# Patient Record
Sex: MALE | Race: OTHER | HISPANIC OR LATINO | Employment: FULL TIME | URBAN - METROPOLITAN AREA
[De-identification: names, ages, dates, MRNs, and addresses within clinical notes are randomized per-mention and may not be internally consistent; named-entity substitution may affect disease eponyms.]

---

## 2020-06-29 ENCOUNTER — TELEPHONE (OUTPATIENT)
Dept: FAMILY MEDICINE | Facility: CLINIC | Age: 55
End: 2020-06-29

## 2020-06-29 NOTE — TELEPHONE ENCOUNTER
Call patient or his wife who is a patient of mine.  Patient can be scheduled in an EP appointment to establish care and put in the appointment notes new patient, provide patient a history form    Remind them to try and bring records

## 2021-08-02 ENCOUNTER — LAB VISIT (OUTPATIENT)
Dept: PRIMARY CARE CLINIC | Facility: CLINIC | Age: 56
End: 2021-08-02
Payer: COMMERCIAL

## 2021-08-02 DIAGNOSIS — Z20.822 ENCOUNTER FOR LABORATORY TESTING FOR COVID-19 VIRUS: ICD-10-CM

## 2021-08-02 PROCEDURE — U0005 INFEC AGEN DETEC AMPLI PROBE: HCPCS | Performed by: INTERNAL MEDICINE

## 2021-08-02 PROCEDURE — U0003 INFECTIOUS AGENT DETECTION BY NUCLEIC ACID (DNA OR RNA); SEVERE ACUTE RESPIRATORY SYNDROME CORONAVIRUS 2 (SARS-COV-2) (CORONAVIRUS DISEASE [COVID-19]), AMPLIFIED PROBE TECHNIQUE, MAKING USE OF HIGH THROUGHPUT TECHNOLOGIES AS DESCRIBED BY CMS-2020-01-R: HCPCS | Performed by: INTERNAL MEDICINE

## 2021-08-03 DIAGNOSIS — U07.1 COVID-19 VIRUS DETECTED: ICD-10-CM

## 2021-08-03 LAB
SARS-COV-2 RNA RESP QL NAA+PROBE: DETECTED
SARS-COV-2- CYCLE NUMBER: 15.05

## 2021-12-29 ENCOUNTER — OFFICE VISIT (OUTPATIENT)
Dept: URGENT CARE | Facility: CLINIC | Age: 56
End: 2021-12-29
Payer: COMMERCIAL

## 2021-12-29 VITALS
DIASTOLIC BLOOD PRESSURE: 87 MMHG | OXYGEN SATURATION: 96 % | RESPIRATION RATE: 20 BRPM | WEIGHT: 220 LBS | HEART RATE: 96 BPM | BODY MASS INDEX: 32.58 KG/M2 | HEIGHT: 69 IN | SYSTOLIC BLOOD PRESSURE: 135 MMHG | TEMPERATURE: 99 F
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
## 2021-12-29 DIAGNOSIS — M25.532 LEFT WRIST PAIN: ICD-10-CM

## 2021-12-29 DIAGNOSIS — Z02.6 ENCOUNTER RELATED TO WORKER'S COMPENSATION CLAIM: Primary | ICD-10-CM

## 2021-12-29 DIAGNOSIS — S63.502A SPRAIN OF LEFT WRIST, INITIAL ENCOUNTER: ICD-10-CM

## 2021-12-29 PROCEDURE — 73130 XR HAND COMPLETE 3 VIEW LEFT: ICD-10-PCS | Mod: FY,LT,S$GLB, | Performed by: RADIOLOGY

## 2021-12-29 PROCEDURE — 99203 OFFICE O/P NEW LOW 30 MIN: CPT | Mod: S$GLB,,, | Performed by: PHYSICIAN ASSISTANT

## 2021-12-29 PROCEDURE — 73110 XR WRIST COMPLETE 3 VIEWS LEFT: ICD-10-PCS | Mod: FY,LT,S$GLB, | Performed by: RADIOLOGY

## 2021-12-29 PROCEDURE — 99203 PR OFFICE/OUTPT VISIT, NEW, LEVL III, 30-44 MIN: ICD-10-PCS | Mod: S$GLB,,, | Performed by: PHYSICIAN ASSISTANT

## 2021-12-29 PROCEDURE — 73110 X-RAY EXAM OF WRIST: CPT | Mod: FY,LT,S$GLB, | Performed by: RADIOLOGY

## 2021-12-29 PROCEDURE — 73130 X-RAY EXAM OF HAND: CPT | Mod: FY,LT,S$GLB, | Performed by: RADIOLOGY

## 2021-12-29 RX ORDER — ATORVASTATIN CALCIUM 40 MG/1
40 TABLET, FILM COATED ORAL DAILY
COMMUNITY
Start: 2021-11-18

## 2021-12-29 RX ORDER — METFORMIN HYDROCHLORIDE 1000 MG/1
1000 TABLET ORAL 2 TIMES DAILY
COMMUNITY
Start: 2021-12-03

## 2021-12-29 RX ORDER — AMLODIPINE AND BENAZEPRIL HYDROCHLORIDE 10; 20 MG/1; MG/1
1 CAPSULE ORAL DAILY
COMMUNITY
Start: 2021-11-18

## 2021-12-29 RX ORDER — ASCORBIC ACID 500 MG
1 TABLET ORAL DAILY
COMMUNITY

## 2021-12-30 ENCOUNTER — OFFICE VISIT (OUTPATIENT)
Dept: URGENT CARE | Facility: CLINIC | Age: 56
End: 2021-12-30
Payer: COMMERCIAL

## 2021-12-30 VITALS
OXYGEN SATURATION: 97 % | TEMPERATURE: 98 F | SYSTOLIC BLOOD PRESSURE: 128 MMHG | DIASTOLIC BLOOD PRESSURE: 82 MMHG | RESPIRATION RATE: 20 BRPM | HEART RATE: 91 BPM

## 2021-12-30 DIAGNOSIS — Z02.6 ENCOUNTER RELATED TO WORKER'S COMPENSATION CLAIM: Primary | ICD-10-CM

## 2021-12-30 DIAGNOSIS — S63.502A SPRAIN OF LEFT WRIST, INITIAL ENCOUNTER: ICD-10-CM

## 2021-12-30 PROCEDURE — 99213 PR OFFICE/OUTPT VISIT, EST, LEVL III, 20-29 MIN: ICD-10-PCS | Mod: S$GLB,,, | Performed by: NURSE PRACTITIONER

## 2021-12-30 PROCEDURE — 99213 OFFICE O/P EST LOW 20 MIN: CPT | Mod: S$GLB,,, | Performed by: NURSE PRACTITIONER

## 2022-01-03 ENCOUNTER — OFFICE VISIT (OUTPATIENT)
Dept: URGENT CARE | Facility: CLINIC | Age: 57
End: 2022-01-03
Payer: COMMERCIAL

## 2022-01-03 DIAGNOSIS — S63.502D SPRAIN OF LEFT WRIST, SUBSEQUENT ENCOUNTER: ICD-10-CM

## 2022-01-03 DIAGNOSIS — Z02.6 ENCOUNTER RELATED TO WORKER'S COMPENSATION CLAIM: Primary | ICD-10-CM

## 2022-01-03 PROCEDURE — 99212 OFFICE O/P EST SF 10 MIN: CPT | Mod: S$GLB,,, | Performed by: NURSE PRACTITIONER

## 2022-01-03 PROCEDURE — 99212 PR OFFICE/OUTPT VISIT, EST, LEVL II, 10-19 MIN: ICD-10-PCS | Mod: S$GLB,,, | Performed by: NURSE PRACTITIONER

## 2022-01-03 NOTE — LETTER
Occupational Health - Urgent Care  [address], [address]  [place] [address]  Phone: [phone]  Fax: [phone]  [facility]: [id number]    Pt Name: Julio Salinas  Injury Date: 12/29/2021   Employee ID: [id number] Date of Treatment: 01/03/2022   Company: [employer]      Appointment Time: 03:30 PM Arrived: 2:53 PM   Provider: NITHYA Stone Time Out: 4:05 PM     Office Treatment:   1. Encounter related to worker's compensation claim    2. Sprain of left wrist, subsequent encounter          Patient Instructions: Attention not to aggravate affected area      Restrictions: Regular Duty     Return As needed. SOLOMON

## 2022-01-03 NOTE — PROGRESS NOTES
Subjective:       Patient ID: Julio Salinas is a 56 y.o. male.    Chief Complaint: Wrist Pain    RV,Workers Comp injury DOI 12/29/2021. Patient states his LEFT WRIST is feeling much better.  He followed instruction on icing and elevation, and feels great. jh    Wrist Pain   The pain is present in the left hand. The problem has been resolved. The pain is at a severity of 0/10. The patient is experiencing no pain. Pertinent negatives include no limited range of motion.       Constitution: Negative.   HENT: Negative.    Cardiovascular: Negative.    Eyes: Negative.    Respiratory: Negative.    Gastrointestinal: Negative.  Negative for bowel incontinence.   Endocrine: negative.   Genitourinary: Negative.  Negative for dysuria, flank pain, bladder incontinence and pelvic pain.   Musculoskeletal: Negative.  Negative for pain, abnormal ROM of joint and back pain.   Skin: Negative.    Allergic/Immunologic: Negative.    Neurological: Negative.    Hematologic/Lymphatic: Negative.    Psychiatric/Behavioral: Negative.         Objective:      Physical Exam  Vitals and nursing note reviewed.   Constitutional:       Appearance: He is well-developed and well-nourished.   HENT:      Head: Normocephalic and atraumatic.      Right Ear: External ear normal.      Left Ear: External ear normal.      Nose: Nose normal.      Mouth/Throat:      Mouth: Oropharynx is clear and moist.   Eyes:      Extraocular Movements: EOM normal.      Conjunctiva/sclera: Conjunctivae normal.      Pupils: Pupils are equal, round, and reactive to light.   Cardiovascular:      Rate and Rhythm: Normal rate and regular rhythm.      Heart sounds: Normal heart sounds.   Abdominal:      General: Bowel sounds are normal.      Palpations: Abdomen is soft.   Musculoskeletal:         General: Normal range of motion.      Cervical back: Normal range of motion and neck supple.   Skin:     General: Skin is warm and dry.      Capillary Refill: Capillary refill takes less  than 2 seconds.   Neurological:      Mental Status: He is alert and oriented to person, place, and time.   Psychiatric:         Mood and Affect: Mood and affect normal.         Behavior: Behavior normal.         Thought Content: Thought content normal.         Judgment: Judgment normal.         Assessment:       1. Encounter related to worker's compensation claim    2. Sprain of left wrist, subsequent encounter        Plan:       Patient improved. Discharged from care     Patient Instructions: Attention not to aggravate affected area   Restrictions: Regular Duty  Follow up if symptoms worsen or fail to improve.

## 2022-01-19 ENCOUNTER — CLINICAL SUPPORT (OUTPATIENT)
Dept: OTHER | Facility: CLINIC | Age: 57
End: 2022-01-19
Payer: COMMERCIAL

## 2022-01-19 DIAGNOSIS — Z00.8 ENCOUNTER FOR OTHER GENERAL EXAMINATION: ICD-10-CM

## 2022-01-20 VITALS — HEIGHT: 69 IN | BODY MASS INDEX: 32.49 KG/M2

## 2022-01-20 LAB
HDLC SERPL-MCNC: 36 MG/DL
POC CHOLESTEROL, LDL (DOCK): 75 MG/DL
POC CHOLESTEROL, TOTAL: 128 MG/DL
POC GLUCOSE, FASTING: 125 MG/DL (ref 60–110)
TRIGL SERPL-MCNC: 85 MG/DL

## 2022-08-09 ENCOUNTER — OCCUPATIONAL HEALTH (OUTPATIENT)
Dept: URGENT CARE | Facility: CLINIC | Age: 57
End: 2022-08-09

## 2022-08-09 ENCOUNTER — OFFICE VISIT (OUTPATIENT)
Dept: URGENT CARE | Facility: CLINIC | Age: 57
End: 2022-08-09
Payer: COMMERCIAL

## 2022-08-09 VITALS
RESPIRATION RATE: 18 BRPM | WEIGHT: 220 LBS | OXYGEN SATURATION: 98 % | TEMPERATURE: 99 F | DIASTOLIC BLOOD PRESSURE: 79 MMHG | BODY MASS INDEX: 32.58 KG/M2 | HEART RATE: 62 BPM | SYSTOLIC BLOOD PRESSURE: 133 MMHG | HEIGHT: 69 IN

## 2022-08-09 DIAGNOSIS — R55 NEAR SYNCOPE: ICD-10-CM

## 2022-08-09 DIAGNOSIS — Z02.6 ENCOUNTER RELATED TO WORKER'S COMPENSATION CLAIM: ICD-10-CM

## 2022-08-09 DIAGNOSIS — Z13.9 ENCOUNTER FOR SCREENING: Primary | ICD-10-CM
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
## 2022-08-09 DIAGNOSIS — S32.040A COMPRESSION FRACTURE OF L4 VERTEBRA, INITIAL ENCOUNTER: Primary | ICD-10-CM

## 2022-08-09 DIAGNOSIS — M54.9 DORSALGIA, UNSPECIFIED: ICD-10-CM

## 2022-08-09 PROCEDURE — 94010 BREATHING CAPACITY TEST: CPT | Mod: S$GLB,,,

## 2022-08-09 PROCEDURE — 99080 OSHA QUESTIONNAIRE: ICD-10-PCS | Mod: S$GLB,,,

## 2022-08-09 PROCEDURE — 72100 X-RAY EXAM L-S SPINE 2/3 VWS: CPT | Mod: S$GLB,,, | Performed by: RADIOLOGY

## 2022-08-09 PROCEDURE — 99002 MASK FIT-QUANTITATIVE: ICD-10-PCS | Mod: S$GLB,,,

## 2022-08-09 PROCEDURE — 99002 DEVICE HANDLING PHYS/QHP: CPT | Mod: S$GLB,,,

## 2022-08-09 PROCEDURE — 94010 PULMONARY FUNCTION SCREENING (OCC MED PHYSICALS): ICD-10-PCS | Mod: S$GLB,,,

## 2022-08-09 PROCEDURE — 99213 OFFICE O/P EST LOW 20 MIN: CPT | Mod: S$GLB,,, | Performed by: NURSE PRACTITIONER

## 2022-08-09 PROCEDURE — 72100 XR LUMBAR SPINE 2 OR 3 VIEWS: ICD-10-PCS | Mod: S$GLB,,, | Performed by: RADIOLOGY

## 2022-08-09 PROCEDURE — 99080 SPECIAL REPORTS OR FORMS: CPT | Mod: S$GLB,,,

## 2022-08-09 PROCEDURE — 99213 PR OFFICE/OUTPT VISIT, EST, LEVL III, 20-29 MIN: ICD-10-PCS | Mod: S$GLB,,, | Performed by: NURSE PRACTITIONER

## 2022-08-09 NOTE — PROGRESS NOTES
Subjective:       Patient ID: Julio Salinas is a 56 y.o. male.    Chief Complaint: Back Pain    New Workers comp injury. 8/9/2022 Patient was doing a PFT with [employer] at the [employer]site for [employer] and when he went to blow he fell back onto his back. Pain 3/10. Nothing for pain.    MA note above-  patietn states he was doing the PFT and he states he felt dizzy and fell down on his buttocks. He states hurts mainly when he is sitting. No pain on movement . He denies previous injury or trauma. He has not taken anything as the incident occurred at the work place 45 minutes prior to arrival at clinic. He denies loss of bowel or bladder control. He denies saddle anesthesia. He denies radiation of pain    Back Pain  This is a new problem. The problem is unchanged. The pain is present in the gluteal and lumbar spine. The quality of the pain is described as aching. The pain does not radiate. The pain is at a severity of 3/10. The pain is mild. The symptoms are aggravated by sitting. Pertinent negatives include no bladder incontinence, bowel incontinence, dysuria, numbness or pelvic pain. He has tried nothing for the symptoms. The treatment provided no relief.       Constitution: Negative.   HENT: Negative.    Cardiovascular: Negative.    Eyes: Negative.    Respiratory: Negative.    Gastrointestinal: Negative.  Negative for bowel incontinence.   Endocrine: negative.   Genitourinary: Negative.  Negative for dysuria, flank pain, bladder incontinence and pelvic pain.   Musculoskeletal: Positive for pain and back pain. Negative for joint pain, joint swelling, abnormal ROM of joint, pain with walking, muscle cramps and muscle ache.   Skin: Negative.  Negative for wound, abrasion, laceration, erythema and bruising.   Allergic/Immunologic: Negative.    Neurological: Negative.  Negative for numbness and tingling.   Hematologic/Lymphatic: Negative.    Psychiatric/Behavioral: Negative.         Objective:      Physical  Exam  Vitals and nursing note reviewed.   Constitutional:       General: He is not in acute distress.     Appearance: Normal appearance. He is well-developed.   HENT:      Head: Normocephalic and atraumatic.      Right Ear: Hearing and external ear normal.      Left Ear: Hearing and external ear normal.      Nose: Nose normal. No nasal deformity.   Eyes:      General: Lids are normal.      Conjunctiva/sclera: Conjunctivae normal.      Right eye: Right conjunctiva is not injected.      Left eye: Left conjunctiva is not injected.   Neck:      Trachea: Trachea normal.   Cardiovascular:      Pulses: Normal pulses.           Dorsalis pedis pulses are 2+ on the right side and 2+ on the left side.        Posterior tibial pulses are 2+ on the right side and 2+ on the left side.   Pulmonary:      Effort: Pulmonary effort is normal. No respiratory distress.      Breath sounds: No stridor.   Musculoskeletal:      Cervical back: Normal and normal range of motion. No spinous process tenderness or muscular tenderness.      Thoracic back: Normal.      Lumbar back: Tenderness present. No deformity. Normal range of motion. Negative right straight leg raise test and negative left straight leg raise test.        Back:    Skin:     General: Skin is warm and dry.      Findings: No abrasion, bruising or erythema.   Neurological:      Mental Status: He is alert.      GCS: GCS eye subscore is 4. GCS verbal subscore is 5. GCS motor subscore is 6.      Sensory: No sensory deficit.      Motor: Motor function is intact. No weakness (BLE strength 5/5).      Deep Tendon Reflexes: Reflexes are normal and symmetric.      Reflex Scores:       Patellar reflexes are 2+ on the right side and 2+ on the left side.       Achilles reflexes are 2+ on the right side and 2+ on the left side.  Psychiatric:         Attention and Perception: He is attentive.         Speech: Speech normal.         Behavior: Behavior normal.         Thought Content: Thought  content normal.       X-Ray Lumbar Spine 2 Or 3 Views    Result Date: 8/9/2022  EXAMINATION: XR LUMBAR SPINE 2 OR 3 VIEWS CLINICAL HISTORY: Low back pain, no red flags, no prior management;WORKERS COMP;  Dorsalgia, unspecified TECHNIQUE: Lumbar spine AP, lateral, spot lateral COMPARISON: None. FINDINGS: The superior endplate along the L4 vertebral body has a mild compression fracture with about 1/3 height loss anteriorly; its age is uncertain.  Other vertebral body heights are preserved.  L5 probably has pars defects, associated with a grade 1 spondylolisthesis.  Degenerative changes are evident with small osteophytes at many levels.  Disc space at L5-S1 is narrowed. Visualized abdomen shows aortic atherosclerosis and stones at both kidneys.     L4 compression fracture. L5 pars defect with grade 1 spondylolisthesis. Bilateral nephrolithiasis. This report was flagged in Epic as abnormal. Electronically signed by: Khai Bang MD Date:    08/09/2022 Time:    14:21    Assessment:       1. Compression fracture of L4 vertebra, initial encounter    2. Encounter related to worker's compensation claim    3. Dorsalgia, unspecified    4. Near syncope    5. Fall, initial encounter        Plan:       Patient with compression fracture at L4 level. No comparison xray to determine if occurred from fall today or previous injury  Will refer to orthopedic spine for further evaluation and treatment.      Patient Instructions: Attention not to aggravate affected area, Apply ice 24-48 hours then apply heat/warm soaks, Referral to specialist to be scheduled, once authorized   Restrictions: Discharged to Ortho/Neuro/Opthomologist/Surgeon, Sedentary work only  No follow-ups on file.

## 2022-08-09 NOTE — LETTER
[facility] - Occupational Health  [address] [address]  Phone: [phone]  Fax: [phone]  [facility]: [id number]    Pt Name: Julio Salinas  Injury Date: 08/09/2022   Employee ID: [id number] Date of First Treatment: 08/09/2022   Company: [employer]      Appointment Time: 01:15 PM Arrived: 1:22pm   Provider: NITHYA Stone Time Out: 2:40pm     Office Treatment:   1. Compression fracture of L4 vertebra, initial encounter    2. Encounter related to worker's compensation claim    3. Dorsalgia, unspecified    4. Near syncope    5. Fall, initial encounter          Patient Instructions: Attention not to aggravate affected area, Apply ice 24-48 hours then apply heat/warm soaks, Referral to specialist to be scheduled, once authorized      Restrictions: Discharged to Ortho/Neuro/Opthomologist/Surgeon, Sedentary work only     EC

## 2023-01-05 ENCOUNTER — CLINICAL SUPPORT (OUTPATIENT)
Dept: OTHER | Facility: CLINIC | Age: 58
End: 2023-01-05

## 2023-01-05 DIAGNOSIS — Z00.8 ENCOUNTER FOR OTHER GENERAL EXAMINATION: ICD-10-CM

## 2023-01-07 VITALS
DIASTOLIC BLOOD PRESSURE: 80 MMHG | SYSTOLIC BLOOD PRESSURE: 132 MMHG | BODY MASS INDEX: 34.87 KG/M2 | WEIGHT: 217 LBS | HEIGHT: 66 IN

## 2023-01-07 LAB
HDLC SERPL-MCNC: 29 MG/DL
POC CHOLESTEROL, LDL (DOCK): 65 MG/DL
POC CHOLESTEROL, TOTAL: 122 MG/DL
POC GLUCOSE, FASTING: 119 MG/DL (ref 60–110)
TRIGL SERPL-MCNC: 163 MG/DL

## 2024-01-09 ENCOUNTER — CLINICAL SUPPORT (OUTPATIENT)
Dept: OTHER | Facility: CLINIC | Age: 59
End: 2024-01-09

## 2024-01-09 DIAGNOSIS — Z00.8 ENCOUNTER FOR OTHER GENERAL EXAMINATION: ICD-10-CM

## 2024-01-10 VITALS
DIASTOLIC BLOOD PRESSURE: 80 MMHG | HEIGHT: 67 IN | SYSTOLIC BLOOD PRESSURE: 106 MMHG | BODY MASS INDEX: 30.61 KG/M2 | WEIGHT: 195 LBS

## 2024-01-10 LAB
HBA1C MFR BLD: 5.1 %
HDLC SERPL-MCNC: 38 MG/DL
POC CHOLESTEROL, LDL (DOCK): 72 MG/DL
POC CHOLESTEROL, TOTAL: 124 MG/DL
POC GLUCOSE, FASTING: 96 MG/DL (ref 60–110)
TRIGL SERPL-MCNC: 64 MG/DL

## 2025-02-05 ENCOUNTER — CLINICAL SUPPORT (OUTPATIENT)
Dept: OTHER | Facility: CLINIC | Age: 60
End: 2025-02-05

## 2025-02-05 DIAGNOSIS — Z00.8 ENCOUNTER FOR OTHER GENERAL EXAMINATION: ICD-10-CM

## 2025-02-06 VITALS
SYSTOLIC BLOOD PRESSURE: 117 MMHG | BODY MASS INDEX: 29.66 KG/M2 | DIASTOLIC BLOOD PRESSURE: 70 MMHG | WEIGHT: 189 LBS | HEIGHT: 67 IN

## 2025-02-06 LAB
HBA1C MFR BLD: 6.2 %
HDLC SERPL-MCNC: 34 MG/DL
POC CHOLESTEROL, TOTAL: 99 MG/DL
POC GLUCOSE, FASTING: 97 MG/DL (ref 60–110)
TRIGL SERPL-MCNC: 67 MG/DL

## 2025-08-08 ENCOUNTER — OFFICE VISIT (OUTPATIENT)
Dept: URGENT CARE | Facility: CLINIC | Age: 60
End: 2025-08-08
Payer: COMMERCIAL

## 2025-08-08 VITALS
HEART RATE: 71 BPM | OXYGEN SATURATION: 99 % | RESPIRATION RATE: 17 BRPM | SYSTOLIC BLOOD PRESSURE: 116 MMHG | BODY MASS INDEX: 29.66 KG/M2 | WEIGHT: 189 LBS | DIASTOLIC BLOOD PRESSURE: 76 MMHG | HEIGHT: 67 IN

## 2025-08-08 DIAGNOSIS — S76.011A STRAIN OF HIP FLEXOR, RIGHT, INITIAL ENCOUNTER: Primary | ICD-10-CM

## 2025-08-08 DIAGNOSIS — S76.011A MUSCLE STRAIN OF RIGHT GLUTEAL REGION, INITIAL ENCOUNTER: ICD-10-CM

## 2025-08-08 DIAGNOSIS — Z02.6 ENCOUNTER RELATED TO WORKER'S COMPENSATION CLAIM: ICD-10-CM

## 2025-08-08 LAB
BREATH ALCOHOL: 0
CTP QC/QA: YES
POC 10 PANEL DRUG SCREEN: NEGATIVE

## 2025-08-08 RX ORDER — DICLOFENAC SODIUM 50 MG/1
50 TABLET, DELAYED RELEASE ORAL 2 TIMES DAILY PRN
Qty: 28 TABLET | Refills: 0 | Status: SHIPPED | OUTPATIENT
Start: 2025-08-08 | End: 2025-08-22

## 2025-08-08 NOTE — PROGRESS NOTES
Subjective:      Patient ID: Julio Salinas is a 59 y.o. male.    Chief Complaint: Hip Pain    Mr. Salinas presents for evaluation of right hip pain, DOI 8/5/25.  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  He reports he was pulling a metal pan (approx 200 lbs) through the shop and had to stop and pivot to move it.  When he pivoted, he felt a sudden pain in the right hip area.  He sat down for a few minutes and it felt better, so he continued to work.  However, when he got home that evening, the area was aching.  The pain occurs when he lifts his leg, when he moves from sit to stand or lying to standing.  He also has pain in the right buttock aggravated by these movements.  He denies any radiating pain down the leg or into the groin or scrotum.   He denies any LBP.  He denies any prior right hip injuries.  He has been taking tylenol and using tiger balm with some relief.    See MA note below.  Patient's place of employment - [employer]  Patient's job title - Extrusion Lead   Date of injury - 08/05/2025  Body part injured including left or right - Right Hip  Injury Mechanism - Movement   What they were doing when they got hurt - Pt was pulling an empty pan, approx. 200lbs,  through the shop and noticed a slight discomfort in the RT hip.  What they did immediately after - Stopped working and went sit down for a little bit, wasn't hurting until later that evening when Pt got home.   Pain scale right now - 3/10  SB.            Constitution: Negative for activity change, appetite change, chills and fever.   HENT:  Negative for ear pain, ear discharge and congestion.    Eyes:  Negative for eye discharge and eye redness.   Respiratory:  Negative for cough.    Gastrointestinal:  Negative for vomiting and diarrhea.   Genitourinary:  Negative for hematuria.   Musculoskeletal:  Positive for pain with walking and muscle ache. Negative for pain and muscle cramps.   Skin:  Negative for rash and hives.   Allergic/Immunologic:  Negative for hives and sneezing.   Neurological:  Negative for passing out, numbness, tingling and seizures.     Objective:     Physical Exam  Constitutional:       Appearance: Normal appearance.   HENT:      Head: Normocephalic and atraumatic.      Right Ear: External ear normal.      Left Ear: External ear normal.      Nose: Nose normal. No congestion or rhinorrhea.   Eyes:      Extraocular Movements: Extraocular movements intact.      Conjunctiva/sclera: Conjunctivae normal.   Pulmonary:      Effort: Pulmonary effort is normal. No respiratory distress.   Musculoskeletal:      Right hip: Tenderness present. No deformity, lacerations, bony tenderness or crepitus. Decreased range of motion. Normal strength.        Legs:       Comments: Right hip with TTP to hip flexors as well as the gluteal area.  There is mild dec in ROM with hip flexion compared to the left.  There is reported pain with hip flexion and sit to stand.  JENNIFER and FADIR negative.  Gait is normal.   Skin:     General: Skin is warm.   Neurological:      General: No focal deficit present.      Mental Status: He is alert and oriented to person, place, and time. Mental status is at baseline.   Psychiatric:         Mood and Affect: Mood normal.         Behavior: Behavior normal.         Thought Content: Thought content normal.         Judgment: Judgment normal.        Assessment:      1. Strain of hip flexor, right, initial encounter    2. Muscle strain of right gluteal region, initial encounter    3. Encounter related to worker's compensation claim      Plan:     Patient's exam is consistent with MSK strain, specifically in the hip flexors and gluteal muscle.  Rx for voltaren BID PRN.  Can supplement with tylenol.  Lifting restriction placed and patient advised to limit prolonged walking and repeated stairs.  He will start using heat to the area and doing gentle stretching daily.  Follow up in 10 days.    Diagnoses and plan discussed with the patient, as  well as the expected course and duration of his symptoms.  All questions and concerns were addressed prior to discharge.  He was advised to follow up in clinic, as scheduled.  He may follow up in clinic sooner, if needed.  Emergency department precautions were given.  Patient verbalized understanding and was happy with the plan of care.   Note dictated with voice recognition software, please excuse any grammatical errors.    Medications Ordered This Encounter   Medications    diclofenac (VOLTAREN) 50 MG EC tablet     Sig: Take 1 tablet (50 mg total) by mouth 2 (two) times daily as needed (pain).     Dispense:  28 tablet     Refill:  0     Patient Instructions: Attention not to aggravate affected area   Work Modifications: No lifting/pushing/pulling more than 25 lbs, No Prolonged standing/walking  Follow up in about 11 days (around 8/19/2025).

## 2025-08-08 NOTE — LETTER
[facility]  [address] [address]  Phone: [phone]  Fax: [phone]  [facility]: [id number]    Pt Name: Julio Salinas  Injury Date: 08/05/2025   Employee ID: [id number] Date of First Treatment: 08/08/2025   Company: [employer]      Appointment Time: 09:30 AM Arrived: 09:35 AM   Provider: Angella Gautam PA-C Time Out:11:05 AM     Office Treatment:   1. Strain of hip flexor, right, initial encounter    2. Muscle strain of right gluteal region, initial encounter    3. Encounter related to worker's compensation claim      Medications Ordered This Encounter   Medications    diclofenac (VOLTAREN) 50 MG EC tablet        Patient Instructions: Attention not to aggravate affected area      Work Modifications: No lifting/pushing/pulling more than 25 lbs, No Prolonged standing/walking     Return Appointment: 8/19/2025 at 9:00 AM.  EMILIANA.

## 2025-08-19 ENCOUNTER — OFFICE VISIT (OUTPATIENT)
Dept: URGENT CARE | Facility: CLINIC | Age: 60
End: 2025-08-19
Payer: COMMERCIAL

## 2025-08-19 VITALS
OXYGEN SATURATION: 98 % | HEIGHT: 67 IN | RESPIRATION RATE: 16 BRPM | SYSTOLIC BLOOD PRESSURE: 117 MMHG | DIASTOLIC BLOOD PRESSURE: 68 MMHG | BODY MASS INDEX: 29.66 KG/M2 | HEART RATE: 78 BPM | WEIGHT: 189 LBS

## 2025-08-19 DIAGNOSIS — S76.011A MUSCLE STRAIN OF RIGHT GLUTEAL REGION, INITIAL ENCOUNTER: ICD-10-CM

## 2025-08-19 DIAGNOSIS — S76.011A STRAIN OF HIP FLEXOR, RIGHT, INITIAL ENCOUNTER: Primary | ICD-10-CM

## 2025-08-19 DIAGNOSIS — Z02.6 ENCOUNTER RELATED TO WORKER'S COMPENSATION CLAIM: ICD-10-CM

## 2025-08-19 PROCEDURE — 99213 OFFICE O/P EST LOW 20 MIN: CPT | Mod: S$GLB,,, | Performed by: PHYSICIAN ASSISTANT
